# Patient Record
Sex: FEMALE | Race: WHITE | NOT HISPANIC OR LATINO
[De-identification: names, ages, dates, MRNs, and addresses within clinical notes are randomized per-mention and may not be internally consistent; named-entity substitution may affect disease eponyms.]

---

## 2020-07-06 ENCOUNTER — APPOINTMENT (OUTPATIENT)
Dept: MAMMOGRAPHY | Facility: CLINIC | Age: 65
End: 2020-07-06
Payer: COMMERCIAL

## 2020-07-06 ENCOUNTER — APPOINTMENT (OUTPATIENT)
Dept: RADIOLOGY | Facility: CLINIC | Age: 65
End: 2020-07-06
Payer: COMMERCIAL

## 2020-07-06 PROCEDURE — 77080 DXA BONE DENSITY AXIAL: CPT

## 2020-07-06 PROCEDURE — 77067 SCR MAMMO BI INCL CAD: CPT

## 2020-07-06 PROCEDURE — 77063 BREAST TOMOSYNTHESIS BI: CPT

## 2020-12-08 ENCOUNTER — APPOINTMENT (OUTPATIENT)
Dept: ULTRASOUND IMAGING | Facility: CLINIC | Age: 65
End: 2020-12-08
Payer: COMMERCIAL

## 2020-12-08 PROCEDURE — 76641 ULTRASOUND BREAST COMPLETE: CPT | Mod: RT

## 2021-01-05 ENCOUNTER — EMERGENCY (EMERGENCY)
Facility: HOSPITAL | Age: 66
LOS: 1 days | End: 2021-01-05
Admitting: EMERGENCY MEDICINE
Payer: MEDICARE

## 2021-01-05 PROCEDURE — 99285 EMERGENCY DEPT VISIT HI MDM: CPT

## 2021-01-05 PROCEDURE — 71045 X-RAY EXAM CHEST 1 VIEW: CPT | Mod: 26

## 2021-01-07 ENCOUNTER — EMERGENCY (EMERGENCY)
Facility: HOSPITAL | Age: 66
LOS: 1 days | End: 2021-01-07
Admitting: EMERGENCY MEDICINE
Payer: MEDICARE

## 2021-01-07 PROCEDURE — 99285 EMERGENCY DEPT VISIT HI MDM: CPT

## 2021-01-07 PROCEDURE — 93010 ELECTROCARDIOGRAM REPORT: CPT

## 2021-01-07 PROCEDURE — 71045 X-RAY EXAM CHEST 1 VIEW: CPT | Mod: 26

## 2021-01-11 ENCOUNTER — TRANSCRIPTION ENCOUNTER (OUTPATIENT)
Age: 66
End: 2021-01-11

## 2021-10-28 ENCOUNTER — OUTPATIENT (OUTPATIENT)
Dept: OUTPATIENT SERVICES | Facility: HOSPITAL | Age: 66
LOS: 1 days | End: 2021-10-28

## 2021-11-03 ENCOUNTER — EMERGENCY (EMERGENCY)
Facility: HOSPITAL | Age: 66
LOS: 1 days | End: 2021-11-03
Admitting: EMERGENCY MEDICINE
Payer: MEDICARE

## 2021-11-03 PROCEDURE — 70450 CT HEAD/BRAIN W/O DYE: CPT | Mod: 26

## 2021-11-03 PROCEDURE — 99284 EMERGENCY DEPT VISIT MOD MDM: CPT

## 2021-11-04 ENCOUNTER — APPOINTMENT (OUTPATIENT)
Dept: ULTRASOUND IMAGING | Facility: CLINIC | Age: 66
End: 2021-11-04
Payer: MEDICARE

## 2021-11-04 ENCOUNTER — RESULT REVIEW (OUTPATIENT)
Age: 66
End: 2021-11-04

## 2021-11-04 PROCEDURE — 93880 EXTRACRANIAL BILAT STUDY: CPT

## 2022-03-14 ENCOUNTER — EMERGENCY (EMERGENCY)
Facility: HOSPITAL | Age: 67
LOS: 1 days | Discharge: HOME | End: 2022-03-14
Admitting: EMERGENCY MEDICINE
Payer: MEDICARE

## 2022-03-14 PROCEDURE — 93010 ELECTROCARDIOGRAM REPORT: CPT

## 2022-03-14 PROCEDURE — 74177 CT ABD & PELVIS W/CONTRAST: CPT | Mod: 26

## 2022-03-14 PROCEDURE — 99284 EMERGENCY DEPT VISIT MOD MDM: CPT

## 2022-03-16 DIAGNOSIS — K52.9 NONINFECTIVE GASTROENTERITIS AND COLITIS, UNSPECIFIED: ICD-10-CM

## 2022-03-16 DIAGNOSIS — K62.5 HEMORRHAGE OF ANUS AND RECTUM: ICD-10-CM

## 2022-03-16 DIAGNOSIS — R10.30 LOWER ABDOMINAL PAIN, UNSPECIFIED: ICD-10-CM

## 2022-04-05 PROBLEM — Z00.00 ENCOUNTER FOR PREVENTIVE HEALTH EXAMINATION: Status: ACTIVE | Noted: 2022-04-05

## 2022-04-06 ENCOUNTER — APPOINTMENT (OUTPATIENT)
Dept: OBGYN | Facility: CLINIC | Age: 67
End: 2022-04-06
Payer: MEDICARE

## 2022-04-06 VITALS
DIASTOLIC BLOOD PRESSURE: 70 MMHG | HEIGHT: 66 IN | SYSTOLIC BLOOD PRESSURE: 114 MMHG | BODY MASS INDEX: 26.52 KG/M2 | WEIGHT: 165 LBS

## 2022-04-06 DIAGNOSIS — Z82.49 FAMILY HISTORY OF ISCHEMIC HEART DISEASE AND OTHER DISEASES OF THE CIRCULATORY SYSTEM: ICD-10-CM

## 2022-04-06 DIAGNOSIS — Z82.3 FAMILY HISTORY OF STROKE: ICD-10-CM

## 2022-04-06 DIAGNOSIS — U07.1 COVID-19: ICD-10-CM

## 2022-04-06 PROCEDURE — 99387 INIT PM E/M NEW PAT 65+ YRS: CPT

## 2022-04-06 PROCEDURE — 99212 OFFICE O/P EST SF 10 MIN: CPT | Mod: 25

## 2022-04-06 NOTE — DISCUSSION/SUMMARY
[FreeTextEntry1] : 66 year old PMF here for wwe,zoster on left buttock, nocturia\par - ucx sent, urogyn referral given for possible OAB\par - pap/hpv sent (this will be last one)\par - sbe reveiwed, Mammo RX given\par - Dexa UTD - due next year, vitamin D and calcium, weight bearing exercises recommended\par - colonoscopy scheduled\par - valtrex sent\par - for tvus to evaluate fibroid\par - will call with results\par - postmenopausal bleeding precautions given\par

## 2022-04-06 NOTE — HISTORY OF PRESENT ILLNESS
[FreeTextEntry1] : CHUCHO MARTIN is a 66 year PMF   here for annual. Not sexually active. She reports since /2 she has pain by her left buttock, saw lesions. She just got over colitis. Patient also reports frequent noctura every 2 hours. no accidents, no REJI sx, does not bother her during the day. She reports drink decaf coffee/tea. Patient had CT A/P at [facility] for colitis and small fundal fibroid seen. She was sent home on abx - is following up with GI. \par Denies vaginal bleeding, discharge.\par \par Gynhx:LMP 55 yrs old: h/o Reg menses, No h/o STIs/cysts/abn paps; h/o fibroids\par Obhx: x1\par \par Last mammo:2020\par Last Colonoscopy:scheduled this month\par Last Pap smear:unsure\par Last dexa: -osteopenia\par

## 2022-04-06 NOTE — PHYSICAL EXAM
[Appropriately responsive] : appropriately responsive [Alert] : alert [No Acute Distress] : no acute distress [No Lymphadenopathy] : no lymphadenopathy [Regular Rate Rhythm] : regular rate rhythm [No Murmurs] : no murmurs [Clear to Auscultation B/L] : clear to auscultation bilaterally [Soft] : soft [Non-tender] : non-tender [Non-distended] : non-distended [No HSM] : No HSM [No Lesions] : no lesions [No Mass] : no mass [Oriented x3] : oriented x3 [Examination Of The Breasts] : a normal appearance [No Masses] : no breast masses were palpable [Labia Majora] : normal [Labia Minora] : normal [Atrophy] : atrophy [Normal] : normal [Uterine Adnexae] : normal [FreeTextEntry9] : left buttock with herpetic like lesions c/w zoster

## 2022-04-07 LAB — HPV HIGH+LOW RISK DNA PNL CVX: NOT DETECTED

## 2022-04-11 LAB — BACTERIA UR CULT: ABNORMAL

## 2022-04-13 LAB — CYTOLOGY CVX/VAG DOC THIN PREP: ABNORMAL

## 2022-04-19 ENCOUNTER — APPOINTMENT (OUTPATIENT)
Dept: OBGYN | Facility: CLINIC | Age: 67
End: 2022-04-19
Payer: MEDICARE

## 2022-04-19 ENCOUNTER — NON-APPOINTMENT (OUTPATIENT)
Age: 67
End: 2022-04-19

## 2022-04-19 ENCOUNTER — ASOB RESULT (OUTPATIENT)
Age: 67
End: 2022-04-19

## 2022-04-19 PROCEDURE — 76856 US EXAM PELVIC COMPLETE: CPT | Mod: 59

## 2022-04-19 PROCEDURE — 76830 TRANSVAGINAL US NON-OB: CPT

## 2022-04-20 ENCOUNTER — NON-APPOINTMENT (OUTPATIENT)
Age: 67
End: 2022-04-20

## 2022-04-22 ENCOUNTER — APPOINTMENT (OUTPATIENT)
Dept: UROGYNECOLOGY | Facility: CLINIC | Age: 67
End: 2022-04-22
Payer: MEDICARE

## 2022-04-22 VITALS
SYSTOLIC BLOOD PRESSURE: 106 MMHG | DIASTOLIC BLOOD PRESSURE: 69 MMHG | HEIGHT: 67 IN | WEIGHT: 165 LBS | BODY MASS INDEX: 25.9 KG/M2

## 2022-04-22 PROCEDURE — 99205 OFFICE O/P NEW HI 60 MIN: CPT

## 2022-04-22 PROCEDURE — 99215 OFFICE O/P EST HI 40 MIN: CPT

## 2022-04-22 NOTE — PROCEDURE
[Straight Catheterization] : insertion of a straight catheter [Urinary Tract Infection] : a urinary tract infection [Other ___] : [unfilled] [Patient] : the patient [___ Fr Straight Tip] : a [unfilled] in [place] straight tip catheter [None] : there were no complications with the catheter insertion [Clear] : clear [Culture] : culture [Urinalysis] : urinalysis [FreeTextEntry1] : PVR 20 cc

## 2022-04-22 NOTE — HISTORY OF PRESENT ILLNESS
[FreeTextEntry1] : Patient is a 66 year old P1 ( x 1) who is referred by Dr. Gupta for evaluation and management of nocturia\par Patient reports waking up from sleep at night q 1-2 hours for past 1 year. She didn't seek treatment thinking it's normal till she mentioned it to Dr. Gupta\par Daytime frequency: q 2 hrs\par Reports intermittent sensation of incomplete bladder emptying\par Reports hx of UTI. Most recent positive urine culture was from 22 (Low cfu of E.coli and Enterococcus) was treated with Bactrim by Dr. Gupta. Taking cranberry complex twice a day\par Endorses snoring\par Reports intermittent vaginal burning intermittently\par Daily fluid intake: 32-60 oz of plain water, Decaf coffee 16 oz.Takes water till she goes to bed\par Not sexually active.\par Had colitis in March which required admission to hospital ([facility] [place])\par Was treated for Shingles earlier this month. \par Denies vaginal bulge symptoms\par Reports semisolid bowel movement. Denies straining or constipation

## 2022-04-22 NOTE — DISCUSSION/SUMMARY
[Visit Time ___ Minutes] : [unfilled] minutes [Face to Face Time___ Minutes] : with [unfilled] minutes in face to face consultation. [FreeTextEntry1] : The patient was counseled regarding the pathophysiology of the atrophic vaginitis, frequent UTI, nocturia and overactive bladder. A total of approximately 60 minutes was spent on this visit, greater than 50%of which was spent on counseling. She was also counseled regarding the risks, benefits, indications, and alternatives of further evaluations studies, as well as various management options. She was given verbal and written information/education on pelvic floor muscle exercises, avoidance of dietary bladder irritants, and other strategies to improve bladder control. Pharmacologic management of nocturia and atrophic vaginitis was discussed. After a detailed discussion, following management plan was outlined:\par 1. 12 weeks trial of behavioral modification, bladder retraining, night time fluid restriction. She declines a trial of OAB medication for now\par 2. Recommended screening for diabetes and sleep apnea. Referral for sleep medicine clinic provided.\par 3. UA with micro and urine culture was ordered to exclude UTI.\par 4. Discussed criteria if diagnosis for frequent UTI. She has only one documented positive culture and recent colitis might have predisposed her. May continue taking cranberry complex \par 5. Some of her symptoms are likely related to vaginal atrophy. Discussed hormonal and non-hormonal treatment options. She has no contraindications to vaginal estrogen. Estrace cream was prescribed x 3-6 months. She was strongly advised not to start the vaginal estrogen till the results of mammogram scheduled next week returns normal. Reviewed vulvar care and OTC vaginal moisturizers.\par 6. F/u in 2-3 months

## 2022-04-22 NOTE — REASON FOR VISIT
[Initial Visit ___] : an initial visit for [unfilled] [Nocturia] : nocturia [Painful Urination] : painful urination

## 2022-04-22 NOTE — PHYSICAL EXAM
[FreeTextEntry1] : General: Not in acute distress, alert and oriented x3.\par Neck: Supple. No lymphadenopathy. \par Abdomen: Soft, nontender, and nondistended. No obvious hepatosplenomegaly. No obvious hernias.\par Pelvic Exam: Normal external female genitalia. Saddle sensory exam S2 to S4 is intact.  Urethra is well supported without prolapse, exudates, or lesions. Cough stress test is negative.  Post void residual was checked with I/O cath and was 20 cc of clear urine. Pale and mildly atrophic-appearing vaginal epithelium. No vaginal blood or discharge. Cervix without abnormal lesions. Bimanual exam reveals a small uterus in normal positioning. No adnexal masses or tenderness. Levator ani contraction is 1/5.  All vaginal compartments are well supported\par \par

## 2022-04-22 NOTE — ASSESSMENT
[FreeTextEntry1] : Patient is a 66-year-old multipara with nocturia, history of recent UTI and atrophic vaginitis. On examination, there is no evidence of urethral hypermobility with a negative empty bladder supine cough stress test, normal postvoid residual, and fair levator ani awareness/contraction. She admits to consumption of large volumes of fluids.

## 2022-04-25 LAB
APPEARANCE: CLEAR
BACTERIA UR CULT: NORMAL
BACTERIA: NEGATIVE
BILIRUBIN URINE: NEGATIVE
BLOOD URINE: NEGATIVE
COLOR: NORMAL
GLUCOSE QUALITATIVE U: NEGATIVE
HYALINE CASTS: 0 /LPF
KETONES URINE: NEGATIVE
LEUKOCYTE ESTERASE URINE: NEGATIVE
MICROSCOPIC-UA: NORMAL
NITRITE URINE: NEGATIVE
PH URINE: 7
PROTEIN URINE: NORMAL
RED BLOOD CELLS URINE: 1 /HPF
SPECIFIC GRAVITY URINE: 1.02
SQUAMOUS EPITHELIAL CELLS: 1 /HPF
UROBILINOGEN URINE: NORMAL
WHITE BLOOD CELLS URINE: 0 /HPF

## 2022-05-11 ENCOUNTER — RESULT REVIEW (OUTPATIENT)
Age: 67
End: 2022-05-11

## 2022-05-11 ENCOUNTER — APPOINTMENT (OUTPATIENT)
Dept: MAMMOGRAPHY | Facility: CLINIC | Age: 67
End: 2022-05-11
Payer: MEDICARE

## 2022-05-11 ENCOUNTER — TRANSCRIPTION ENCOUNTER (OUTPATIENT)
Age: 67
End: 2022-05-11

## 2022-05-11 PROCEDURE — 77063 BREAST TOMOSYNTHESIS BI: CPT

## 2022-05-11 PROCEDURE — 77067 SCR MAMMO BI INCL CAD: CPT

## 2022-06-24 ENCOUNTER — APPOINTMENT (OUTPATIENT)
Dept: UROGYNECOLOGY | Facility: CLINIC | Age: 67
End: 2022-06-24

## 2022-06-24 VITALS
DIASTOLIC BLOOD PRESSURE: 68 MMHG | HEIGHT: 67 IN | WEIGHT: 165 LBS | SYSTOLIC BLOOD PRESSURE: 108 MMHG | BODY MASS INDEX: 25.9 KG/M2

## 2022-06-24 PROCEDURE — 99213 OFFICE O/P EST LOW 20 MIN: CPT

## 2022-06-24 NOTE — DISCUSSION/SUMMARY
[Reviewed Clinical Lab Test(s)] : Results of clinical tests were reviewed. [Visit Time ___ Minutes] : [unfilled] minutes [Face to Face Time___ Minutes] : with [unfilled] minutes in face to face consultation. [FreeTextEntry1] : Patient was counselled regrading use 1 gm vaginally twice per week. Correct insertion technique was reviewed. Discussed continuing vaginal estrogen use for 4 months followed by use of organic coconut oil vaginally\par She had one UTI in early April 2022, MAKAYLA was negative. Doesn't meet the criteria for work of frequent UTI at present\par She declines trial of OAB medication for now. She is satisfied with her improvement with behavioral modification\par F/u in 4 months unless develops any symptoms\par Patient comfortable with this plan

## 2022-06-24 NOTE — ASSESSMENT
[FreeTextEntry1] : Patient is a 66-year-old multipara with nocturia, history of recent UTI and atrophic vaginitis who is doing better with dietary modification and issue of vaginal estrogen cream. Her most recent urine culture was negative and she had a normal PVR on last exam

## 2022-06-24 NOTE — PHYSICAL EXAM
[No Acute Distress] : in no acute distress [Well developed] : well developed [Oriented x3] : oriented to person, place, and time

## 2022-06-24 NOTE — HISTORY OF PRESENT ILLNESS
[FreeTextEntry1] : Patient is a 66-year-old multipara with nocturia, history of recent UTI and atrophic vaginitis who was initially seen on 4/22/22. On previous examination, there ws no evidence of urethral hypermobility with a negative empty bladder supine cough stress test, normal postvoid residual, and fair levator ani awareness/contraction. She admitted to consumption of large volumes of fluids. She was offered a trial of vaginal estrogen cream, behavioral modification and testing for sleep apnea.\par She decides not to pursue testing for sleep apnea. She started using vaginal estrogen cream on 6/14 . She was using the whole applicator full and starting feeling lower abdominal discomfort/ cramps\par She reports almost complete resolution of nocturia. She cut back intake of bladder irritants. Denies dysuria. Urine culture from April 2022 was negative

## 2022-08-16 ENCOUNTER — APPOINTMENT (OUTPATIENT)
Dept: OBGYN | Facility: CLINIC | Age: 67
End: 2022-08-16

## 2022-08-16 ENCOUNTER — ASOB RESULT (OUTPATIENT)
Age: 67
End: 2022-08-16

## 2022-08-16 ENCOUNTER — APPOINTMENT (OUTPATIENT)
Dept: ANTEPARTUM | Facility: CLINIC | Age: 67
End: 2022-08-16

## 2022-08-16 VITALS
BODY MASS INDEX: 25.43 KG/M2 | HEIGHT: 67 IN | SYSTOLIC BLOOD PRESSURE: 122 MMHG | WEIGHT: 162 LBS | DIASTOLIC BLOOD PRESSURE: 80 MMHG

## 2022-08-16 DIAGNOSIS — N39.0 URINARY TRACT INFECTION, SITE NOT SPECIFIED: ICD-10-CM

## 2022-08-16 LAB
BILIRUB UR QL STRIP: NORMAL
CLARITY UR: CLEAR
COLLECTION METHOD: NORMAL
GLUCOSE UR-MCNC: NORMAL
HCG UR QL: 0.2 EU/DL
HGB UR QL STRIP.AUTO: NORMAL
KETONES UR-MCNC: NORMAL
LEUKOCYTE ESTERASE UR QL STRIP: NORMAL
NITRITE UR QL STRIP: NORMAL
PH UR STRIP: 7
PROT UR STRIP-MCNC: NORMAL
SP GR UR STRIP: 1.02

## 2022-08-16 PROCEDURE — P9615: CPT

## 2022-08-16 PROCEDURE — 76856 US EXAM PELVIC COMPLETE: CPT | Mod: 59

## 2022-08-16 PROCEDURE — 76830 TRANSVAGINAL US NON-OB: CPT

## 2022-08-16 PROCEDURE — 81003 URINALYSIS AUTO W/O SCOPE: CPT | Mod: QW

## 2022-08-16 PROCEDURE — 99213 OFFICE O/P EST LOW 20 MIN: CPT

## 2022-08-16 NOTE — HISTORY OF PRESENT ILLNESS
[FreeTextEntry1] : Patient here to follow up after tuvs - fibroids smaller, possible polyp stable - no bleeding\par patient reports dysuria since sunday\par udip small blood\par \par \par will send ucx and macrobid\par pmb precautiosn gvine - pt prefers not to w.u polyp unless it causes bleeding or changes on sono\par for annual and tvus 4/2023

## 2022-08-22 LAB — BACTERIA UR CULT: ABNORMAL

## 2022-10-10 ENCOUNTER — APPOINTMENT (OUTPATIENT)
Dept: ULTRASOUND IMAGING | Facility: CLINIC | Age: 67
End: 2022-10-10

## 2022-10-26 ENCOUNTER — APPOINTMENT (OUTPATIENT)
Dept: ULTRASOUND IMAGING | Facility: CLINIC | Age: 67
End: 2022-10-26

## 2022-10-26 PROCEDURE — 76770 US EXAM ABDO BACK WALL COMP: CPT

## 2022-10-28 ENCOUNTER — APPOINTMENT (OUTPATIENT)
Dept: UROGYNECOLOGY | Facility: CLINIC | Age: 67
End: 2022-10-28

## 2022-10-28 VITALS
HEIGHT: 67 IN | SYSTOLIC BLOOD PRESSURE: 112 MMHG | BODY MASS INDEX: 25.43 KG/M2 | WEIGHT: 162 LBS | DIASTOLIC BLOOD PRESSURE: 70 MMHG

## 2022-10-28 PROCEDURE — 99213 OFFICE O/P EST LOW 20 MIN: CPT

## 2022-10-28 NOTE — ASSESSMENT
[FreeTextEntry1] : Patient is a 66-year-old multipara with nocturia, history of recent UTI and atrophic vaginitis .  She had 2 positive urine cultures since April 2022 both showed small CFU's of E. coli and Enterococcus.  This may represent colonization however she was symptom Her most recent urine culture was negative. she had a normal PVR on last exam.  She she reports cramping with vaginal estrogen cream\par

## 2022-10-28 NOTE — HISTORY OF PRESENT ILLNESS
[FreeTextEntry1] : Patient is a 67-year-old multipara with nocturia, history of recent UTI and atrophic vaginitis who was initially seen on 4/22/22. On previous examination, there was no evidence of urethral hypermobility with a negative empty bladder supine cough stress test, normal postvoid residual, and fair levator ani awareness/contraction. She admitted to consumption of large volumes of fluids. She was offered a trial of vaginal estrogen cream, behavioral modification and testing for sleep apnea.\par She decides not to pursue testing for sleep apnea. She started using vaginal estrogen cream on 6/14 . She was using the whole applicator full and starting feeling lower abdominal discomfort/ cramps and decided to discontinue estrogen use.  She had a positive urine culture in April 2020 through and was treated by Dr. Gupta.  Urine culture from her last visit with me on April 22, 2022 was negative.  She saw Dr. Gupta again in August 2022 and reported burning.  Her urine culture grew small quantities of E. coli and Enterococcus.  She was treated with Bactrim.  She saw Dr. Davenport in end of September and reported urinary burning sensation.  She was prescribed Keflex which she decided not to take because her urine did not show infection.  She is taking cranberry tablets.\par She had a renal ultrasound earlier this month which showed no hydronephrosis or stone.  She has a small cyst in the right kidney.\par \par

## 2022-10-28 NOTE — DISCUSSION/SUMMARY
[Visit Time ___ Minutes] : [unfilled] minutes [Face to Face Time___ Minutes] : with [unfilled] minutes in face to face consultation. [FreeTextEntry1] : We discussed options for UTI prophylaxis.  I offered her a trial of low-dose vaginal estrogen estrogen tablet versus low-dose oral antibiotic daily for 3 months.  She is not keen on using antibiotic continuously for 3 months and would rather try lower-dose vaginal estrogen.  Prescription for Imvexxy vaginal inserts sent to her pharmacy.\par I also provided her a prescription for urine culture for as needed use.\par Counseled about use of Pyridium as needed for urinary burning while awaiting results of urine culture.\par Will consider cystoscopy if she develops UTI while on prophylaxis.  Her recent renal imaging is normal.\par Patient reports normal bowel movements next

## 2022-10-31 LAB
APPEARANCE: CLEAR
BACTERIA UR CULT: NORMAL
BACTERIA: NEGATIVE
BILIRUBIN URINE: NEGATIVE
BLOOD URINE: NEGATIVE
COLOR: NORMAL
GLUCOSE QUALITATIVE U: NEGATIVE
HYALINE CASTS: 1 /LPF
KETONES URINE: NEGATIVE
LEUKOCYTE ESTERASE URINE: NEGATIVE
MICROSCOPIC-UA: NORMAL
NITRITE URINE: NEGATIVE
PH URINE: 7
PROTEIN URINE: NORMAL
RED BLOOD CELLS URINE: 3 /HPF
SPECIFIC GRAVITY URINE: 1.02
SQUAMOUS EPITHELIAL CELLS: 1 /HPF
UROBILINOGEN URINE: NORMAL
WHITE BLOOD CELLS URINE: 1 /HPF

## 2022-12-29 ENCOUNTER — APPOINTMENT (OUTPATIENT)
Dept: UROGYNECOLOGY | Facility: CLINIC | Age: 67
End: 2022-12-29

## 2022-12-29 NOTE — HISTORY OF PRESENT ILLNESS
[FreeTextEntry1] : Patient is a 67-year-old multipara with nocturia, history of recent UTI and atrophic vaginitis who was initially seen on 4/22/22. On previous examination, there was no evidence of urethral hypermobility with a negative empty bladder supine cough stress test, normal postvoid residual, and fair levator ani awareness/contraction. She admitted to consumption of large volumes of fluids. She was offered a trial of vaginal estrogen cream, behavioral modification and testing for sleep apnea.\par She decides not to pursue testing for sleep apnea.\par  She had a positive urine culture in April 2020 through and was treated by Dr. Gupta. Urine culture from her last visit with me on April 22, 2022 was negative. She saw Dr. Gupta again in August 2022 and reported burning. Her urine culture grew small quantities of E. coli and Enterococcus. She was treated with Bactrim. She saw Dr. Davenport in end of September and reported urinary burning sensation. She was prescribed Keflex which she decided not to take because her urine did not show infection. She is taking cranberry tablets.  Urine culture from last visit on October 2022 was negative.\par She had a renal ultrasound which showed no hydronephrosis or stone. She has a small cyst in the right kidney.\par

## 2022-12-29 NOTE — ASSESSMENT
[FreeTextEntry1] : Patient is a 66-year-old multipara with nocturia, history of recent UTI and atrophic vaginitis. She had 2 positive urine cultures since April 2022 both showed small CFU's of E. coli and Enterococcus.  Her most recent urine culture was negative. she had a normal PVR on last exam.  She is using vaginal estrogen supplementation.  She refused antibiotic prophylaxis.

## 2023-01-26 ENCOUNTER — APPOINTMENT (OUTPATIENT)
Dept: UROGYNECOLOGY | Facility: CLINIC | Age: 68
End: 2023-01-26
Payer: MEDICARE

## 2023-01-26 VITALS
WEIGHT: 162 LBS | BODY MASS INDEX: 25.43 KG/M2 | SYSTOLIC BLOOD PRESSURE: 122 MMHG | HEIGHT: 67 IN | DIASTOLIC BLOOD PRESSURE: 74 MMHG

## 2023-01-26 PROCEDURE — 99213 OFFICE O/P EST LOW 20 MIN: CPT

## 2023-01-26 NOTE — ASSESSMENT
[FreeTextEntry1] : Patient reports overall improvement in vaginal symptoms and decreased frequency of UTI with use of Imvexxy but she used it only for a months.

## 2023-01-26 NOTE — HISTORY OF PRESENT ILLNESS
[FreeTextEntry1] : Patient is a 67-year-old multipara with nocturia, history of recent UTI and atrophic vaginitis.  She was last seen in October 2022.  Urine culture from October 28, 2022 was negative.  She had 2 positive urine cultures since April 2022 both showed small CFU's of E. coli and Enterococcus. She had a normal PVR on previous exam.\par Patient was started on Imvexxy in October 2022.  She only used it for a month and he did not know that she was supposed to continue using it however she states that it really helped her she thinks she had a UTI in November.  And was treated with nitrofurantoin for 5 days.  The culture is not available to review\par She is no longer waking up at night to urinate.\par States that Imvexxy helped her with vaginal burning. She did experience a brief episode of vaginal burning last night but it was gone this morning.\par She had normal renal imaging in October 2022.

## 2023-01-26 NOTE — DISCUSSION/SUMMARY
[Visit Time ___ Minutes] : [unfilled] minutes [Face to Face Time___ Minutes] : with [unfilled] minutes in face to face consultation. [FreeTextEntry1] : Recommended continuing Imvexxy use for 6 month\par Prescription for urine culture renewed.  She was advised to call our office if she ends up going to the lab for urine culture.\par Discussed postdefecation perineal care.\par F/u in 3 months\par

## 2023-01-30 LAB
APPEARANCE: CLEAR
BACTERIA UR CULT: NORMAL
BACTERIA: NEGATIVE
BILIRUBIN URINE: NEGATIVE
BLOOD URINE: ABNORMAL
COLOR: YELLOW
GLUCOSE QUALITATIVE U: NEGATIVE
HYALINE CASTS: 0 /LPF
KETONES URINE: NEGATIVE
LEUKOCYTE ESTERASE URINE: NEGATIVE
MICROSCOPIC-UA: NORMAL
NITRITE URINE: NEGATIVE
PH URINE: 6.5
PROTEIN URINE: ABNORMAL
RED BLOOD CELLS URINE: 5 /HPF
SPECIFIC GRAVITY URINE: 1.02
SQUAMOUS EPITHELIAL CELLS: 4 /HPF
UROBILINOGEN URINE: NORMAL
WHITE BLOOD CELLS URINE: 1 /HPF

## 2023-03-06 ENCOUNTER — APPOINTMENT (OUTPATIENT)
Dept: CARDIOLOGY | Facility: CLINIC | Age: 68
End: 2023-03-06
Payer: MEDICARE

## 2023-03-06 ENCOUNTER — NON-APPOINTMENT (OUTPATIENT)
Age: 68
End: 2023-03-06

## 2023-03-06 VITALS
WEIGHT: 168 LBS | OXYGEN SATURATION: 97 % | BODY MASS INDEX: 26.31 KG/M2 | TEMPERATURE: 97.8 F | DIASTOLIC BLOOD PRESSURE: 62 MMHG | SYSTOLIC BLOOD PRESSURE: 106 MMHG | HEART RATE: 77 BPM

## 2023-03-06 DIAGNOSIS — Z01.419 ENCOUNTER FOR GYNECOLOGICAL EXAMINATION (GENERAL) (ROUTINE) W/OUT ABNORMAL FINDINGS: ICD-10-CM

## 2023-03-06 DIAGNOSIS — Z72.3 LACK OF PHYSICAL EXERCISE: ICD-10-CM

## 2023-03-06 DIAGNOSIS — Z78.9 OTHER SPECIFIED HEALTH STATUS: ICD-10-CM

## 2023-03-06 DIAGNOSIS — Z87.898 PERSONAL HISTORY OF OTHER SPECIFIED CONDITIONS: ICD-10-CM

## 2023-03-06 DIAGNOSIS — Z82.49 FAMILY HISTORY OF ISCHEMIC HEART DISEASE AND OTHER DISEASES OF THE CIRCULATORY SYSTEM: ICD-10-CM

## 2023-03-06 DIAGNOSIS — Z86.018 PERSONAL HISTORY OF OTHER BENIGN NEOPLASM: ICD-10-CM

## 2023-03-06 DIAGNOSIS — Z86.19 PERSONAL HISTORY OF OTHER INFECTIOUS AND PARASITIC DISEASES: ICD-10-CM

## 2023-03-06 PROCEDURE — 93000 ELECTROCARDIOGRAM COMPLETE: CPT

## 2023-03-06 PROCEDURE — 99205 OFFICE O/P NEW HI 60 MIN: CPT

## 2023-03-06 RX ORDER — ESTRADIOL 10 UG/1
10 INSERT VAGINAL
Refills: 0 | Status: ACTIVE | COMMUNITY
Start: 2023-03-03

## 2023-03-06 NOTE — ASSESSMENT
[FreeTextEntry1] : Shortness of breath on more than usual exertion , history of heart burns, dyslipidemia -I recommend echocardiography for LV size, LV wall motion, LVEF, valvular morphology.  I also recommended Lexiscan myocardial perfusion scan to evaluate for heartburn as well as CAD.\par \par Dyslipidemia -low-cholesterol diet.\par \par Heartburn -possible GERD ; she has been advised to avoid citrus foods and juices, caffeine products; she has been also advised not to eat 3 hours prior to her bedtime.\par \par Risk factor modification has been discussed with her at great length.  She will be reevaluated by me after cardiac testing.

## 2023-03-06 NOTE — HISTORY OF PRESENT ILLNESS
[FreeTextEntry1] : 67-year-old female patient without significant cardiac history who has been diagnosed dyslipidemia, she prefers not to go on statin came for evaluation.  She does have family history of coronary disease.\par \par She does not exercise per se, remains active.  She does get short of breath on more than usual exertion.  She denies any PND, orthopnea, diaphoresis, dizziness, palpitation Or edema.  Lately she has been getting heartburn, retrosternal in spite she is avoiding citrus foods and juices.\par \par No prior history of CHF, MI, syncope, diabetes, hypertension.

## 2023-03-28 ENCOUNTER — APPOINTMENT (OUTPATIENT)
Dept: CARDIOLOGY | Facility: CLINIC | Age: 68
End: 2023-03-28
Payer: MEDICARE

## 2023-03-28 PROCEDURE — 93880 EXTRACRANIAL BILAT STUDY: CPT

## 2023-03-28 PROCEDURE — 93306 TTE W/DOPPLER COMPLETE: CPT

## 2023-03-30 ENCOUNTER — APPOINTMENT (OUTPATIENT)
Dept: CARDIOLOGY | Facility: CLINIC | Age: 68
End: 2023-03-30
Payer: MEDICARE

## 2023-03-30 PROCEDURE — 93015 CV STRESS TEST SUPVJ I&R: CPT

## 2023-03-30 PROCEDURE — 78452 HT MUSCLE IMAGE SPECT MULT: CPT

## 2023-03-30 PROCEDURE — A9502: CPT

## 2023-04-11 ENCOUNTER — APPOINTMENT (OUTPATIENT)
Dept: ANTEPARTUM | Facility: CLINIC | Age: 68
End: 2023-04-11

## 2023-04-13 ENCOUNTER — APPOINTMENT (OUTPATIENT)
Dept: OBGYN | Facility: CLINIC | Age: 68
End: 2023-04-13

## 2023-04-18 ENCOUNTER — APPOINTMENT (OUTPATIENT)
Dept: CARDIOLOGY | Facility: CLINIC | Age: 68
End: 2023-04-18
Payer: MEDICARE

## 2023-04-18 VITALS
WEIGHT: 160 LBS | BODY MASS INDEX: 25.11 KG/M2 | HEART RATE: 60 BPM | SYSTOLIC BLOOD PRESSURE: 118 MMHG | HEIGHT: 67 IN | DIASTOLIC BLOOD PRESSURE: 60 MMHG | OXYGEN SATURATION: 99 %

## 2023-04-18 PROCEDURE — 99215 OFFICE O/P EST HI 40 MIN: CPT

## 2023-04-25 ENCOUNTER — APPOINTMENT (OUTPATIENT)
Dept: UROGYNECOLOGY | Facility: CLINIC | Age: 68
End: 2023-04-25
Payer: MEDICARE

## 2023-04-25 VITALS — DIASTOLIC BLOOD PRESSURE: 77 MMHG | SYSTOLIC BLOOD PRESSURE: 128 MMHG

## 2023-04-25 PROCEDURE — 99213 OFFICE O/P EST LOW 20 MIN: CPT

## 2023-04-25 NOTE — HISTORY OF PRESENT ILLNESS
[FreeTextEntry1] : Patient is a 67-year-old multipara with nocturia, history of recent UTI and atrophic vaginitis. She was last seen in October 2022. Urine culture from October 28, 2022 was negative. She had 2 positive urine cultures since April 2022 both showed small CFU's of E. coli and Enterococcus. She had a normal PVR on previous exam.\par Patient was started on Imvexxy in October 2022. She only used it for a month and he did not know that she was supposed to continue using it however she states that it really helped her.  She was advised to resume Imvexxy on her last appointment in January 2023.\par Her urine analysis from January 2023 showed 5 RBCs per high-power field with negative urine culture. This was a voided specimen. Patient has been off Imvexxy and was complaining of vaginal burning and dryness. This was likely related to the atrophic vaginitis. Patient previous urine analysis from a cath specimen was negative and she had negative renal imaging in October 2022. \par She has no new concerns today.  She continues to use the Imvexxy.  Reports complete resolution of vaginal burning.  No concerns for bladder infection\par

## 2023-04-25 NOTE — DISCUSSION/SUMMARY
[FreeTextEntry1] : Recommended continuing Imvexxy for now\par Repeat UA and culture today.  If microscopic hematuria again noted, will recommend cystoscopy and urine cytology.  She had negative renal imaging from October 2022.  Patient is comfortable with this plan.\par Follow-up in 3 months

## 2023-04-25 NOTE — ASSESSMENT
[FreeTextEntry1] : Patient reports overall improvement in vaginal symptoms and and has not had a UTI for the last 6 months.  She has been using Imvexxy more consistently.  Her urinalysis have been positive for 3-5 RBCs per high-power field.

## 2023-07-27 ENCOUNTER — APPOINTMENT (OUTPATIENT)
Dept: UROGYNECOLOGY | Facility: CLINIC | Age: 68
End: 2023-07-27

## 2023-10-05 ENCOUNTER — APPOINTMENT (OUTPATIENT)
Dept: UROGYNECOLOGY | Facility: CLINIC | Age: 68
End: 2023-10-05
Payer: MEDICARE

## 2023-10-05 VITALS
HEIGHT: 67 IN | SYSTOLIC BLOOD PRESSURE: 126 MMHG | BODY MASS INDEX: 25.11 KG/M2 | DIASTOLIC BLOOD PRESSURE: 80 MMHG | WEIGHT: 160 LBS

## 2023-10-05 VITALS
WEIGHT: 160 LBS | HEIGHT: 67 IN | DIASTOLIC BLOOD PRESSURE: 70 MMHG | SYSTOLIC BLOOD PRESSURE: 124 MMHG | BODY MASS INDEX: 25.11 KG/M2

## 2023-10-05 PROCEDURE — 99214 OFFICE O/P EST MOD 30 MIN: CPT | Mod: 25

## 2023-10-05 PROCEDURE — 81003 URINALYSIS AUTO W/O SCOPE: CPT | Mod: QW

## 2023-10-05 PROCEDURE — 51701 INSERT BLADDER CATHETER: CPT

## 2023-10-05 RX ORDER — ESTRADIOL 10 UG/1
10 INSERT VAGINAL
Qty: 12 | Refills: 6 | Status: ACTIVE | COMMUNITY
Start: 2023-10-05 | End: 1900-01-01

## 2023-10-06 LAB
BILIRUB UR QL STRIP: NEGATIVE
CLARITY UR: NORMAL
COLLECTION METHOD: NORMAL
GLUCOSE UR-MCNC: NEGATIVE
HCG UR QL: 0.2 EU/DL
HGB UR QL STRIP.AUTO: NORMAL
KETONES UR-MCNC: NEGATIVE
LEUKOCYTE ESTERASE UR QL STRIP: NEGATIVE
NITRITE UR QL STRIP: NEGATIVE
PH UR STRIP: 5.5
PROT UR STRIP-MCNC: NEGATIVE
SP GR UR STRIP: 1.03

## 2023-10-08 LAB — BACTERIA UR CULT: NORMAL

## 2023-11-02 ENCOUNTER — LABORATORY RESULT (OUTPATIENT)
Age: 68
End: 2023-11-02

## 2023-11-02 ENCOUNTER — APPOINTMENT (OUTPATIENT)
Dept: UROGYNECOLOGY | Facility: CLINIC | Age: 68
End: 2023-11-02
Payer: MEDICARE

## 2023-11-02 PROCEDURE — 52000 CYSTOURETHROSCOPY: CPT

## 2023-11-15 ENCOUNTER — APPOINTMENT (OUTPATIENT)
Dept: CARDIOLOGY | Facility: CLINIC | Age: 68
End: 2023-11-15

## 2023-12-15 ENCOUNTER — APPOINTMENT (OUTPATIENT)
Dept: ULTRASOUND IMAGING | Facility: CLINIC | Age: 68
End: 2023-12-15
Payer: MEDICARE

## 2023-12-15 PROCEDURE — 76830 TRANSVAGINAL US NON-OB: CPT

## 2023-12-17 ENCOUNTER — NON-APPOINTMENT (OUTPATIENT)
Age: 68
End: 2023-12-17

## 2023-12-17 VITALS — BODY MASS INDEX: 24.48 KG/M2 | HEIGHT: 67 IN | WEIGHT: 156 LBS

## 2023-12-17 DIAGNOSIS — Z87.891 PERSONAL HISTORY OF NICOTINE DEPENDENCE: ICD-10-CM

## 2023-12-17 NOTE — HISTORY OF PRESENT ILLNESS
[Former] : Former [>=20 Pack-Years] : Twenty pack years or greater smoking history: Yes [TextBox_13] :  Referred by Dr. Jericho Melchor,   Ms. CHUCHO MARTIN  is a 68 year old woman with a history of dyslipidemia.   She  was seen in the office by Dr. Melchor  for review of eligibility for, as well as, discussion of Low-Dose CT lung cancer screening program. Over the telephone today we reviewed and confirmed that the patient meets screening eligibility criteria: -Age: 68 year  Smoking status: -Former smoker  -Number of pack(s) per day: 0.75 -Number of years smoked: 37 -Number of pack years smokin.75 -Number of years since quitting smoking: 15 -Quit year:   Ms. MARTIN denies any signs or symptoms of lung cancer including new cough, change in cough, hemoptysis and unintentional weight loss.   Ms. MARTIN denies any personal history of lung cancer. No lung cancer in a 1st degree relative. Denies any history of lung disease. Denies any history of occupational exposures  [PacksperYear] : 74.10

## 2023-12-17 NOTE — PLAN
[FreeTextEntry1] :  Plan: -Low Dose CT chest for lung cancer screening  Engaged in shared decision making with Ms. CHUCHOMURALI MARTIN . Answered all questions. She verbalized understanding and agreement. She knows to call back with any questions or concerns

## 2024-01-02 ENCOUNTER — APPOINTMENT (OUTPATIENT)
Dept: CT IMAGING | Facility: CLINIC | Age: 69
End: 2024-01-02

## 2024-01-11 ENCOUNTER — APPOINTMENT (OUTPATIENT)
Dept: CT IMAGING | Facility: CLINIC | Age: 69
End: 2024-01-11
Payer: MEDICARE

## 2024-01-11 ENCOUNTER — RESULT REVIEW (OUTPATIENT)
Age: 69
End: 2024-01-11

## 2024-01-11 PROCEDURE — 74178 CT ABD&PLV WO CNTR FLWD CNTR: CPT

## 2024-01-19 ENCOUNTER — APPOINTMENT (OUTPATIENT)
Dept: MAMMOGRAPHY | Facility: CLINIC | Age: 69
End: 2024-01-19
Payer: MEDICARE

## 2024-01-19 PROCEDURE — 77067 SCR MAMMO BI INCL CAD: CPT

## 2024-01-19 PROCEDURE — 77063 BREAST TOMOSYNTHESIS BI: CPT

## 2024-01-23 ENCOUNTER — APPOINTMENT (OUTPATIENT)
Dept: CARDIOLOGY | Facility: CLINIC | Age: 69
End: 2024-01-23
Payer: MEDICARE

## 2024-01-23 ENCOUNTER — NON-APPOINTMENT (OUTPATIENT)
Age: 69
End: 2024-01-23

## 2024-01-23 VITALS
WEIGHT: 162 LBS | HEART RATE: 70 BPM | OXYGEN SATURATION: 96 % | BODY MASS INDEX: 25.37 KG/M2 | SYSTOLIC BLOOD PRESSURE: 110 MMHG | DIASTOLIC BLOOD PRESSURE: 82 MMHG

## 2024-01-23 DIAGNOSIS — N95.2 POSTMENOPAUSAL ATROPHIC VAGINITIS: ICD-10-CM

## 2024-01-23 DIAGNOSIS — Z87.440 PERSONAL HISTORY OF URINARY (TRACT) INFECTIONS: ICD-10-CM

## 2024-01-23 DIAGNOSIS — K21.9 GASTRO-ESOPHAGEAL REFLUX DISEASE W/OUT ESOPHAGITIS: ICD-10-CM

## 2024-01-23 DIAGNOSIS — E78.5 HYPERLIPIDEMIA, UNSPECIFIED: ICD-10-CM

## 2024-01-23 DIAGNOSIS — R31.29 OTHER MICROSCOPIC HEMATURIA: ICD-10-CM

## 2024-01-23 DIAGNOSIS — R10.9 UNSPECIFIED ABDOMINAL PAIN: ICD-10-CM

## 2024-01-23 DIAGNOSIS — R06.02 SHORTNESS OF BREATH: ICD-10-CM

## 2024-01-23 PROCEDURE — 93000 ELECTROCARDIOGRAM COMPLETE: CPT

## 2024-01-23 PROCEDURE — 99214 OFFICE O/P EST MOD 30 MIN: CPT

## 2024-01-23 NOTE — PHYSICAL EXAM
[Well Developed] : well developed [Well Nourished] : well nourished [No Acute Distress] : no acute distress [Normal Conjunctiva] : normal conjunctiva [Normal Venous Pressure] : normal venous pressure [No Carotid Bruit] : no carotid bruit [Normal S1, S2] : normal S1, S2 [No Murmur] : no murmur [No Rub] : no rub [No Gallop] : no gallop [Good Air Entry] : good air entry [Clear Lung Fields] : clear lung fields [No Respiratory Distress] : no respiratory distress  [Soft] : abdomen soft [Non Tender] : non-tender [No Masses/organomegaly] : no masses/organomegaly [Normal Bowel Sounds] : normal bowel sounds [Normal Gait] : normal gait [No Cyanosis] : no cyanosis [No Edema] : no edema [No Clubbing] : no clubbing [No Varicosities] : no varicosities [No Rash] : no rash [No Skin Lesions] : no skin lesions [Moves all extremities] : moves all extremities [No Focal Deficits] : no focal deficits [Normal Speech] : normal speech [Alert and Oriented] : alert and oriented [Normal memory] : normal memory

## 2024-01-23 NOTE — HISTORY OF PRESENT ILLNESS
[FreeTextEntry1] : 68-year-old female patient without significant cardiac history who has been diagnosed dyslipidemia, she prefers not to go on statin came for evaluation.  She does have family history of coronary disease.  She does not exercise per se, remains active.  She does get short of breath on more than usual exertion.  She denies any PND, orthopnea, diaphoresis, dizziness, palpitation Or edema.  Her heartburn has improved ,she is avoiding citrus foods and juices.  Today she is here for cardiac testing follow-up. March 28, 2023   echocardiogram showed normal size, LV thickness, and wall motion, LV 65% without significant valvular abnormality. March 20, 2023   catheter pressure minimal plaquing without hemodynamically significant disease. March 30, 2023    Nuclear stress test done using Lamont protocol; she exercised for 7.5 minutes with peak heart rate 131 bpm, peak blood pressure 136/74 mmHg, no symptoms, no EKG changes, total normal myocardial perfusion scan, exercise Duke treadmill score 7.5 which is low risk score.  .  No prior history of CHF, MI, syncope, diabetes, hypertension.

## 2024-01-23 NOTE — DISCUSSION/SUMMARY
[FreeTextEntry1] : Shortness of breath on more than usual exertion , history of heart burns, dyslipidemia - echocardiography confirmed normal LV size, LV wall motion, LVEF, valvular morphology. Exercise myocardial perfusion scan did not show inducible ischemia; further testing needed at this point  Dyslipidemia -low-cholesterol diet. She is tolerating Crestor 10 mg daily.  lipid panel on August 17, 2023 showed LDL 61  Carotid stenosis -she will need intermittent artery Doppler.  Heartburn -possible GERD ; she has been advised to avoid citrus foods and juices, caffeine products; she has been also advised not to eat 3 hours prior to her bedtime.  Risk factor modification has been discussed with her at great length. She will be reevaluated by me in 6 months.

## 2024-02-06 ENCOUNTER — APPOINTMENT (OUTPATIENT)
Dept: RADIOLOGY | Facility: CLINIC | Age: 69
End: 2024-02-06
Payer: MEDICARE

## 2024-02-06 PROCEDURE — 71046 X-RAY EXAM CHEST 2 VIEWS: CPT

## 2024-05-11 ENCOUNTER — RX RENEWAL (OUTPATIENT)
Age: 69
End: 2024-05-11

## 2024-05-12 RX ORDER — ROSUVASTATIN CALCIUM 10 MG/1
10 TABLET, FILM COATED ORAL DAILY
Qty: 90 | Refills: 1 | Status: ACTIVE | COMMUNITY
Start: 2023-04-18 | End: 1900-01-01

## 2024-07-25 ENCOUNTER — NON-APPOINTMENT (OUTPATIENT)
Age: 69
End: 2024-07-25

## 2024-07-25 ENCOUNTER — APPOINTMENT (OUTPATIENT)
Dept: CARDIOLOGY | Facility: CLINIC | Age: 69
End: 2024-07-25
Payer: MEDICARE

## 2024-07-25 VITALS
SYSTOLIC BLOOD PRESSURE: 114 MMHG | HEIGHT: 67 IN | DIASTOLIC BLOOD PRESSURE: 58 MMHG | WEIGHT: 155 LBS | HEART RATE: 65 BPM | BODY MASS INDEX: 24.33 KG/M2 | OXYGEN SATURATION: 96 %

## 2024-07-25 DIAGNOSIS — R06.02 SHORTNESS OF BREATH: ICD-10-CM

## 2024-07-25 DIAGNOSIS — E78.5 HYPERLIPIDEMIA, UNSPECIFIED: ICD-10-CM

## 2024-07-25 PROCEDURE — 99214 OFFICE O/P EST MOD 30 MIN: CPT

## 2024-07-25 PROCEDURE — 93000 ELECTROCARDIOGRAM COMPLETE: CPT

## 2024-07-25 RX ORDER — ASCORBIC ACID 500 MG
TABLET ORAL DAILY
Refills: 0 | Status: ACTIVE | COMMUNITY

## 2024-07-25 RX ORDER — COLD-HOT PACK
EACH MISCELLANEOUS DAILY
Refills: 0 | Status: ACTIVE | COMMUNITY

## 2024-07-25 RX ORDER — UBIDECARENONE 200 MG
200 CAPSULE ORAL DAILY
Refills: 0 | Status: ACTIVE | COMMUNITY

## 2024-07-25 RX ORDER — CHONDROITIN/COLLAGEN/HYALURON 500 MG
CAPSULE ORAL DAILY
Refills: 0 | Status: ACTIVE | COMMUNITY

## 2024-07-25 NOTE — DISCUSSION/SUMMARY
[FreeTextEntry1] : Shortness of breath: resolved.  - echocardiography confirmed normal LV size, LV wall motion, LVEF, valvular morphology. Exercise myocardial perfusion scan did not show inducible ischemia; no further testing needed at this point  Dyslipidemia -low-cholesterol diet. She is tolerating Crestor 10 mg daily. reviewed lipid panel from 2/2024 with excellent results.  Carotid stenosis -she will need intermittent artery Doppler. Continue rosuvastatin 10mg daily.  Heartburn -possible GERD ; she has been advised to avoid citrus foods and juices, caffeine products; she has been also advised not to eat 3 hours prior to her bedtime.  Follow up in 6 months. [EKG obtained to assist in diagnosis and management of assessed problem(s)] : EKG obtained to assist in diagnosis and management of assessed problem(s)

## 2024-07-25 NOTE — HISTORY OF PRESENT ILLNESS
[FreeTextEntry1] : 69-year-old female patient without significant cardiac history who has been diagnosed dyslipidemia, she prefers not to go on statin came for evaluation.  She does have family history of coronary disease.  She does not exercise per se, remains active.  She does get short of breath on more than usual exertion.  She denies any PND, orthopnea, diaphoresis, dizziness, palpitation Or edema.  Her heartburn has improved ,she is avoiding citrus foods and juices.  Today she is here for cardiac testing follow-up. March 28, 2023   echocardiogram showed normal size, LV thickness, and wall motion, LV 65% without significant valvular abnormality. March 20, 2023   catheter pressure minimal plaquing without hemodynamically significant disease. March 30, 2023    Nuclear stress test done using Lamont protocol; she exercised for 7.5 minutes with peak heart rate 131 bpm, peak blood pressure 136/74 mmHg, no symptoms, no EKG changes, total normal myocardial perfusion scan, exercise Duke treadmill score 7.5 which is low risk score.   No prior history of CHF, MI, syncope, diabetes, hypertension.

## 2024-11-05 ENCOUNTER — APPOINTMENT (OUTPATIENT)
Dept: OBGYN | Facility: CLINIC | Age: 69
End: 2024-11-05
Payer: MEDICARE

## 2024-11-05 VITALS
WEIGHT: 160 LBS | DIASTOLIC BLOOD PRESSURE: 74 MMHG | BODY MASS INDEX: 25.11 KG/M2 | HEIGHT: 67 IN | SYSTOLIC BLOOD PRESSURE: 118 MMHG

## 2024-11-05 DIAGNOSIS — Z01.419 ENCOUNTER FOR GYNECOLOGICAL EXAMINATION (GENERAL) (ROUTINE) W/OUT ABNORMAL FINDINGS: ICD-10-CM

## 2024-11-05 DIAGNOSIS — N89.8 OTHER SPECIFIED NONINFLAMMATORY DISORDERS OF VAGINA: ICD-10-CM

## 2024-11-05 PROCEDURE — G0101: CPT

## 2024-11-05 PROCEDURE — 99212 OFFICE O/P EST SF 10 MIN: CPT | Mod: 25

## 2024-11-06 LAB — HPV HIGH+LOW RISK DNA PNL CVX: NOT DETECTED

## 2024-11-09 LAB
A VAGINAE DNA VAG QL NAA+PROBE: NORMAL
BVAB2 DNA VAG QL NAA+PROBE: NORMAL
C KRUSEI DNA VAG QL NAA+PROBE: NEGATIVE
CANDIDA DNA VAG QL NAA+PROBE: NEGATIVE
MEGA1 DNA VAG QL NAA+PROBE: NORMAL
T VAGINALIS RRNA SPEC QL NAA+PROBE: NEGATIVE

## 2024-11-10 LAB — CYTOLOGY CVX/VAG DOC THIN PREP: NORMAL

## 2024-11-12 ENCOUNTER — APPOINTMENT (OUTPATIENT)
Dept: RADIOLOGY | Facility: CLINIC | Age: 69
End: 2024-11-12
Payer: MEDICARE

## 2024-11-12 PROCEDURE — 77085 DXA BONE DENSITY AXL VRT FX: CPT

## 2025-01-16 ENCOUNTER — LABORATORY RESULT (OUTPATIENT)
Age: 70
End: 2025-01-16

## 2025-01-22 ENCOUNTER — APPOINTMENT (OUTPATIENT)
Dept: CARDIOLOGY | Facility: CLINIC | Age: 70
End: 2025-01-22
Payer: MEDICARE

## 2025-01-22 ENCOUNTER — NON-APPOINTMENT (OUTPATIENT)
Age: 70
End: 2025-01-22

## 2025-01-22 VITALS
OXYGEN SATURATION: 98 % | HEART RATE: 75 BPM | SYSTOLIC BLOOD PRESSURE: 108 MMHG | WEIGHT: 165 LBS | DIASTOLIC BLOOD PRESSURE: 58 MMHG | BODY MASS INDEX: 25.84 KG/M2

## 2025-01-22 VITALS
WEIGHT: 165 LBS | BODY MASS INDEX: 25.84 KG/M2 | DIASTOLIC BLOOD PRESSURE: 58 MMHG | HEART RATE: 75 BPM | OXYGEN SATURATION: 98 % | SYSTOLIC BLOOD PRESSURE: 108 MMHG

## 2025-01-22 DIAGNOSIS — R06.02 SHORTNESS OF BREATH: ICD-10-CM

## 2025-01-22 DIAGNOSIS — K21.9 GASTRO-ESOPHAGEAL REFLUX DISEASE W/OUT ESOPHAGITIS: ICD-10-CM

## 2025-01-22 DIAGNOSIS — E78.5 HYPERLIPIDEMIA, UNSPECIFIED: ICD-10-CM

## 2025-01-22 PROCEDURE — G2211 COMPLEX E/M VISIT ADD ON: CPT

## 2025-01-22 PROCEDURE — 99214 OFFICE O/P EST MOD 30 MIN: CPT

## 2025-01-22 RX ORDER — UBIDECARENONE/VIT E ACET 100MG-5
1000 CAPSULE ORAL
Refills: 0 | Status: ACTIVE | COMMUNITY

## 2025-02-28 ENCOUNTER — RESULT REVIEW (OUTPATIENT)
Age: 70
End: 2025-02-28

## 2025-02-28 ENCOUNTER — APPOINTMENT (OUTPATIENT)
Dept: MAMMOGRAPHY | Facility: CLINIC | Age: 70
End: 2025-02-28
Payer: MEDICARE

## 2025-02-28 PROCEDURE — 77063 BREAST TOMOSYNTHESIS BI: CPT

## 2025-02-28 PROCEDURE — 77067 SCR MAMMO BI INCL CAD: CPT

## 2025-07-29 ENCOUNTER — NON-APPOINTMENT (OUTPATIENT)
Age: 70
End: 2025-07-29

## 2025-08-21 ENCOUNTER — RX RENEWAL (OUTPATIENT)
Age: 70
End: 2025-08-21